# Patient Record
Sex: FEMALE | Race: OTHER | HISPANIC OR LATINO | URBAN - METROPOLITAN AREA
[De-identification: names, ages, dates, MRNs, and addresses within clinical notes are randomized per-mention and may not be internally consistent; named-entity substitution may affect disease eponyms.]

---

## 2020-06-04 ENCOUNTER — EMERGENCY (EMERGENCY)
Facility: HOSPITAL | Age: 35
LOS: 1 days | Discharge: HOME | End: 2020-06-04
Attending: EMERGENCY MEDICINE | Admitting: EMERGENCY MEDICINE
Payer: COMMERCIAL

## 2020-06-04 VITALS
SYSTOLIC BLOOD PRESSURE: 136 MMHG | OXYGEN SATURATION: 100 % | HEART RATE: 81 BPM | DIASTOLIC BLOOD PRESSURE: 96 MMHG | RESPIRATION RATE: 16 BRPM | TEMPERATURE: 98 F

## 2020-06-04 VITALS
DIASTOLIC BLOOD PRESSURE: 102 MMHG | SYSTOLIC BLOOD PRESSURE: 151 MMHG | OXYGEN SATURATION: 100 % | HEART RATE: 87 BPM | RESPIRATION RATE: 15 BRPM | TEMPERATURE: 99 F

## 2020-06-04 LAB
ALBUMIN SERPL ELPH-MCNC: 5 G/DL — SIGNIFICANT CHANGE UP (ref 3.3–5)
ALP SERPL-CCNC: 110 U/L — SIGNIFICANT CHANGE UP (ref 40–120)
ALT FLD-CCNC: 27 U/L — SIGNIFICANT CHANGE UP (ref 4–33)
ANION GAP SERPL CALC-SCNC: 15 MMO/L — HIGH (ref 7–14)
AST SERPL-CCNC: 20 U/L — SIGNIFICANT CHANGE UP (ref 4–32)
BILIRUB SERPL-MCNC: < 0.2 MG/DL — LOW (ref 0.2–1.2)
BUN SERPL-MCNC: 8 MG/DL — SIGNIFICANT CHANGE UP (ref 7–23)
CALCIUM SERPL-MCNC: 10.3 MG/DL — SIGNIFICANT CHANGE UP (ref 8.4–10.5)
CHLORIDE SERPL-SCNC: 98 MMOL/L — SIGNIFICANT CHANGE UP (ref 98–107)
CO2 SERPL-SCNC: 25 MMOL/L — SIGNIFICANT CHANGE UP (ref 22–31)
CREAT SERPL-MCNC: 0.45 MG/DL — LOW (ref 0.5–1.3)
D DIMER BLD IA.RAPID-MCNC: < 150 NG/ML — SIGNIFICANT CHANGE UP
GLUCOSE SERPL-MCNC: 207 MG/DL — HIGH (ref 70–99)
HCT VFR BLD CALC: 40.8 % — SIGNIFICANT CHANGE UP (ref 34.5–45)
HGB BLD-MCNC: 13.2 G/DL — SIGNIFICANT CHANGE UP (ref 11.5–15.5)
MCHC RBC-ENTMCNC: 27.6 PG — SIGNIFICANT CHANGE UP (ref 27–34)
MCHC RBC-ENTMCNC: 32.4 % — SIGNIFICANT CHANGE UP (ref 32–36)
MCV RBC AUTO: 85.2 FL — SIGNIFICANT CHANGE UP (ref 80–100)
NRBC # FLD: 0 K/UL — SIGNIFICANT CHANGE UP (ref 0–0)
PLATELET # BLD AUTO: 361 K/UL — SIGNIFICANT CHANGE UP (ref 150–400)
PMV BLD: 11 FL — SIGNIFICANT CHANGE UP (ref 7–13)
POTASSIUM SERPL-MCNC: 4.2 MMOL/L — SIGNIFICANT CHANGE UP (ref 3.5–5.3)
POTASSIUM SERPL-SCNC: 4.2 MMOL/L — SIGNIFICANT CHANGE UP (ref 3.5–5.3)
PROT SERPL-MCNC: 8.4 G/DL — HIGH (ref 6–8.3)
RBC # BLD: 4.79 M/UL — SIGNIFICANT CHANGE UP (ref 3.8–5.2)
RBC # FLD: 13.6 % — SIGNIFICANT CHANGE UP (ref 10.3–14.5)
SODIUM SERPL-SCNC: 138 MMOL/L — SIGNIFICANT CHANGE UP (ref 135–145)
TROPONIN T, HIGH SENSITIVITY: < 6 NG/L — SIGNIFICANT CHANGE UP (ref ?–14)
WBC # BLD: 6.06 K/UL — SIGNIFICANT CHANGE UP (ref 3.8–10.5)
WBC # FLD AUTO: 6.06 K/UL — SIGNIFICANT CHANGE UP (ref 3.8–10.5)

## 2020-06-04 PROCEDURE — 71046 X-RAY EXAM CHEST 2 VIEWS: CPT | Mod: 26

## 2020-06-04 PROCEDURE — 93010 ELECTROCARDIOGRAM REPORT: CPT

## 2020-06-04 PROCEDURE — 99284 EMERGENCY DEPT VISIT MOD MDM: CPT | Mod: 25

## 2020-06-04 NOTE — ED PROVIDER NOTE - PATIENT PORTAL LINK FT
You can access the FollowMyHealth Patient Portal offered by [facility] by registering at the following website: [email or web address]. By joining [employer]’s FollowMyHealth portal, you will also be able to view your health information using other applications (apps) compatible with our system.

## 2020-06-04 NOTE — ED PROVIDER NOTE - OBJECTIVE STATEMENT
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 States initially it was on/off, today its constant, non radiating, non exertional  pain. No associated RODRIGUEZ or SOB Described as stabbing but not pleuritic. no cough no fever/chills. no abdominal pain. No N/V. Feeling constipated. not on meds. no OCP> LMP a month ago, denies preg. non smoker no ETOH no family hx of CAD

## 2020-06-04 NOTE — ED PROVIDER NOTE - PHYSICAL EXAMINATION
Dr Wagner  in bed, no distress.   mmm. non icteric.  normal S1-S2 nontender chest wall.   No resp distress. able to speak in full and clear sentences. no wheeze, rales or stridor. pulse ox 98RA  soft nontender abdomen. no  rebound. no guarding. no sign of trauma. no CVAT no rash/ no vesicles   no pedal edema. no calf tenderness. normal pulses bilateral feet.

## 2020-06-04 NOTE — ED PROVIDER NOTE - NSFOLLOWUPINSTRUCTIONS_ED_ALL_ED_FT
You were seen in the Emergency Department for chest pain.  1) Advance activity as tolerated.    2) Continue all previously prescribed medications as directed.    3) Follow up with your primary care physician in 24-48 hours - take copies of your results.  A referral list has been attached for you.   4) Return to the Emergency Department for worsening or persistent symptoms, and/or ANY NEW OR CONCERNING SYMPTOMS as described below.    You were seen today in the emergency room for chest pain. Although the testing done today indicates that your pain is not from an acute emergency, your pain could still represent a problem with your heart. You need to follow up with your doctor and/or a cardiologist in the next 48-72 hours. If you develop any new or worsening symptoms you need to return immediately to the emergency department. If you experience any of the following please come right back to the emergency room: chest pain that becomes much worse with walking up stairs or exercising, uncontrollable nausea and vomiting, severe chest pain that will not go away, passing out, new persistent numbness and/or weakness. If we discussed that this pain was likely due to a muscle strain or sprain you should take over the counter medications such as Tylenol. You should avoid taking medications such as ibuprofen, Motrin, Advil or other NSAIDs until you speak with your doctor about your pain.    Te vieron en el departamento de emergencias por dolor en el pecho.  1) Avance de la actividad según lo tolerado.  2) Continúe con todos los medicamentos recetados previamente según las indicaciones.  3) Hazel un seguimiento con laamr médico de atención primaria en 24-48 horas: tome copias de adelso resultados. Se ha adjuntado nubia lista de referencias para usted.  4) Regrese al Departamento de Emergencia por síntomas empeorados o persistentes, y / o CUALQUIER SÍNTOMA NUEVO O RELACIONADO marysol se describe a continuación.    Lo vieron hoy en la shai de emergencias por dolor en el pecho. Aunque las pruebas realizadas hoy indican que lamar dolor no proviene de nubia emergencia aguda, lamar dolor aún podría representar un problema con lamar corazón. Debe realizar un seguimiento con lamar médico y / o un cardiólogo en las próximas 48-72 horas. Si desarrolla algún síntoma nuevo o que empeora, debe regresar de inmediato al departamento de emergencias. Si experimenta alguno de los siguientes síntomas, regrese a la shai de emergencias: dolor en el pecho que empeora mucho al subir escaleras o hacer ejercicio, náuseas y vómitos incontrolables, dolor intenso en el pecho que no desaparece, desmayo, nuevo entumecimiento persistente y / o debilidad. Si discutimos que lamberto dolor probablemente se debió a nubia distensión muscular o un esguince, debe angelica medicamentos de venta mulugeta marysol Tylenol. Debe evitar angelica medicamentos marysol ibuprofeno, Motrin, Advil u otros JEFFERSON hasta que hable con lamar médico sobre lamar dolor.

## 2020-06-04 NOTE — ED ADULT NURSE NOTE - OBJECTIVE STATEMENT
Pt comes in c/o CP that started yesterday afternoon. Pt states she took Tylenol yesterday and pain subsided but states the pain returned this AM with tylenol not being effective. Pt is rating pain a 7/10 that feels like it is pulsating. Pt denies pain radiating to any other part of the body and denies any other symptoms including SOB, fever, cough, and any GI issues. Pt axox4 and able to ambulate without assistance and with a steady gait. Pt appears in no distress at this time and noted to have even and unlabored resp. Pt monitored via cardiac monitor and pulse ox at this time. Will continue to monitor.

## 2020-06-04 NOTE — ED ADULT TRIAGE NOTE - CHIEF COMPLAINT QUOTE
Pt c/o midsternal chest pain for the past two days.  Denies any dizziness/nausea.  Denies any fevers/chills/cough/SOB.  Respirations even and unlabored in triage.  Denies any PMHx.